# Patient Record
Sex: FEMALE | Race: WHITE
[De-identification: names, ages, dates, MRNs, and addresses within clinical notes are randomized per-mention and may not be internally consistent; named-entity substitution may affect disease eponyms.]

---

## 2022-07-03 ENCOUNTER — HOSPITAL ENCOUNTER (INPATIENT)
Dept: HOSPITAL 95 - ER | Age: 87
LOS: 4 days | Discharge: NURSING, REHAB OR LONG-TERM CARE | DRG: 193 | End: 2022-07-07
Attending: STUDENT IN AN ORGANIZED HEALTH CARE EDUCATION/TRAINING PROGRAM | Admitting: STUDENT IN AN ORGANIZED HEALTH CARE EDUCATION/TRAINING PROGRAM
Payer: COMMERCIAL

## 2022-07-03 VITALS — HEIGHT: 61 IN | WEIGHT: 139.99 LBS | BODY MASS INDEX: 26.43 KG/M2

## 2022-07-03 DIAGNOSIS — N39.0: ICD-10-CM

## 2022-07-03 DIAGNOSIS — E83.52: ICD-10-CM

## 2022-07-03 DIAGNOSIS — Z96.653: ICD-10-CM

## 2022-07-03 DIAGNOSIS — Z90.12: ICD-10-CM

## 2022-07-03 DIAGNOSIS — I48.91: ICD-10-CM

## 2022-07-03 DIAGNOSIS — Z85.3: ICD-10-CM

## 2022-07-03 DIAGNOSIS — Z98.890: ICD-10-CM

## 2022-07-03 DIAGNOSIS — Z90.710: ICD-10-CM

## 2022-07-03 DIAGNOSIS — Z79.811: ICD-10-CM

## 2022-07-03 DIAGNOSIS — Z79.82: ICD-10-CM

## 2022-07-03 DIAGNOSIS — E83.39: ICD-10-CM

## 2022-07-03 DIAGNOSIS — T43.225A: ICD-10-CM

## 2022-07-03 DIAGNOSIS — A04.72: ICD-10-CM

## 2022-07-03 DIAGNOSIS — Z99.2: ICD-10-CM

## 2022-07-03 DIAGNOSIS — Z79.02: ICD-10-CM

## 2022-07-03 DIAGNOSIS — E78.5: ICD-10-CM

## 2022-07-03 DIAGNOSIS — Z86.73: ICD-10-CM

## 2022-07-03 DIAGNOSIS — K80.20: ICD-10-CM

## 2022-07-03 DIAGNOSIS — F01.51: ICD-10-CM

## 2022-07-03 DIAGNOSIS — M54.9: ICD-10-CM

## 2022-07-03 DIAGNOSIS — G93.41: ICD-10-CM

## 2022-07-03 DIAGNOSIS — Z20.822: ICD-10-CM

## 2022-07-03 DIAGNOSIS — N18.6: ICD-10-CM

## 2022-07-03 DIAGNOSIS — Z79.899: ICD-10-CM

## 2022-07-03 DIAGNOSIS — J18.9: Primary | ICD-10-CM

## 2022-07-03 DIAGNOSIS — E87.1: ICD-10-CM

## 2022-07-03 DIAGNOSIS — T50.2X5A: ICD-10-CM

## 2022-07-03 DIAGNOSIS — E87.8: ICD-10-CM

## 2022-07-03 DIAGNOSIS — I12.0: ICD-10-CM

## 2022-07-03 DIAGNOSIS — F41.9: ICD-10-CM

## 2022-07-03 DIAGNOSIS — M51.9: ICD-10-CM

## 2022-07-03 DIAGNOSIS — F11.20: ICD-10-CM

## 2022-07-03 DIAGNOSIS — Z66: ICD-10-CM

## 2022-07-03 DIAGNOSIS — I70.0: ICD-10-CM

## 2022-07-03 DIAGNOSIS — G89.29: ICD-10-CM

## 2022-07-03 DIAGNOSIS — E87.6: ICD-10-CM

## 2022-07-03 LAB
ALBUMIN SERPL BCP-MCNC: 3 G/DL (ref 3.4–5)
ALBUMIN SERPL BCP-MCNC: 3.1 G/DL (ref 3.4–5)
ALBUMIN/GLOB SERPL: 0.8 {RATIO} (ref 0.8–1.8)
ALT SERPL W P-5'-P-CCNC: 27 U/L (ref 12–78)
ANION GAP SERPL CALCULATED.4IONS-SCNC: 10 MMOL/L (ref 6–16)
ANION GAP SERPL CALCULATED.4IONS-SCNC: 11 MMOL/L (ref 6–16)
AST SERPL W P-5'-P-CCNC: 29 U/L (ref 12–37)
BASOPHILS # BLD AUTO: 0.02 K/MM3 (ref 0–0.23)
BASOPHILS NFR BLD AUTO: 0 % (ref 0–2)
BILIRUB SERPL-MCNC: 1.6 MG/DL (ref 0.1–1)
BUN SERPL-MCNC: 15 MG/DL (ref 8–24)
BUN SERPL-MCNC: 16 MG/DL (ref 8–24)
CALCIUM SERPL-MCNC: 10 MG/DL (ref 8.5–10.1)
CALCIUM SERPL-MCNC: 10 MG/DL (ref 8.5–10.1)
CHLORIDE SERPL-SCNC: 92 MMOL/L (ref 98–108)
CHLORIDE SERPL-SCNC: 94 MMOL/L (ref 98–108)
CO2 SERPL-SCNC: 24 MMOL/L (ref 21–32)
CO2 SERPL-SCNC: 28 MMOL/L (ref 21–32)
CREAT SERPL-MCNC: 0.96 MG/DL (ref 0.4–1)
CREAT SERPL-MCNC: 0.99 MG/DL (ref 0.4–1)
DEPRECATED RDW RBC AUTO: 45.4 FL (ref 35.1–46.3)
EOSINOPHIL # BLD AUTO: 0 K/MM3 (ref 0–0.68)
EOSINOPHIL NFR BLD AUTO: 0 % (ref 0–6)
ERYTHROCYTE [DISTWIDTH] IN BLOOD BY AUTOMATED COUNT: 13.5 % (ref 11.7–14.2)
FLUAV RNA SPEC QL NAA+PROBE: NEGATIVE
FLUBV RNA SPEC QL NAA+PROBE: NEGATIVE
GLOBULIN SER CALC-MCNC: 3.8 G/DL (ref 2.2–4)
GLUCOSE SERPL-MCNC: 141 MG/DL (ref 70–99)
GLUCOSE SERPL-MCNC: 142 MG/DL (ref 70–99)
HCT VFR BLD AUTO: 38.7 % (ref 33–51)
HGB BLD-MCNC: 13.4 G/DL (ref 11.5–16)
IMM GRANULOCYTES # BLD AUTO: 0.06 K/MM3 (ref 0–0.1)
IMM GRANULOCYTES NFR BLD AUTO: 0 % (ref 0–1)
LEUKOCYTE ESTERASE UR QL STRIP: (no result)
LYMPHOCYTES # BLD AUTO: 0.57 K/MM3 (ref 0.84–5.2)
LYMPHOCYTES NFR BLD AUTO: 4 % (ref 21–46)
MAGNESIUM SERPL-MCNC: 1.7 MG/DL (ref 1.6–2.4)
MCHC RBC AUTO-ENTMCNC: 34.6 G/DL (ref 31.5–36.5)
MCV RBC AUTO: 92 FL (ref 80–100)
MONOCYTES # BLD AUTO: 0.75 K/MM3 (ref 0.16–1.47)
MONOCYTES NFR BLD AUTO: 6 % (ref 4–13)
NEUTROPHILS # BLD AUTO: 12.21 K/MM3 (ref 1.96–9.15)
NEUTROPHILS NFR BLD AUTO: 90 % (ref 41–73)
NRBC # BLD AUTO: 0 K/MM3 (ref 0–0.02)
NRBC BLD AUTO-RTO: 0 /100 WBC (ref 0–0.2)
PHOSPHATE SERPL-MCNC: 2 MG/DL (ref 2.5–4.9)
PLATELET # BLD AUTO: 258 K/MM3 (ref 150–400)
POTASSIUM SERPL-SCNC: 2.9 MMOL/L (ref 3.5–5.5)
POTASSIUM SERPL-SCNC: 3 MMOL/L (ref 3.5–5.5)
PROT SERPL-MCNC: 6.9 G/DL (ref 6.4–8.2)
PROT UR STRIP-MCNC: (no result) MG/DL
RSV RNA SPEC QL NAA+PROBE: NEGATIVE
SARS-COV-2 RNA RESP QL NAA+PROBE: NEGATIVE
SODIUM SERPL-SCNC: 129 MMOL/L (ref 136–145)
SODIUM SERPL-SCNC: 130 MMOL/L (ref 136–145)
SP GR SPEC: 1 (ref 1–1.02)
TSH SERPL DL<=0.005 MIU/L-ACNC: 0.57 UIU/ML (ref 0.36–4.8)
UROBILINOGEN UR STRIP-MCNC: (no result) MG/DL
WBC #/AREA URNS HPF: (no result) /HPF (ref 0–5)

## 2022-07-03 PROCEDURE — A9270 NON-COVERED ITEM OR SERVICE: HCPCS

## 2022-07-04 LAB
ALBUMIN SERPL BCP-MCNC: 2.6 G/DL (ref 3.4–5)
ANION GAP SERPL CALCULATED.4IONS-SCNC: 8 MMOL/L (ref 6–16)
BASOPHILS # BLD AUTO: 0.02 K/MM3 (ref 0–0.23)
BASOPHILS NFR BLD AUTO: 0 % (ref 0–2)
BUN SERPL-MCNC: 18 MG/DL (ref 8–24)
CALCIUM SERPL-MCNC: 9.2 MG/DL (ref 8.5–10.1)
CHLORIDE SERPL-SCNC: 94 MMOL/L (ref 98–108)
CO2 SERPL-SCNC: 26 MMOL/L (ref 21–32)
CREAT SERPL-MCNC: 0.61 MG/DL (ref 0.4–1)
DEPRECATED RDW RBC AUTO: 46.5 FL (ref 35.1–46.3)
EOSINOPHIL # BLD AUTO: 0.06 K/MM3 (ref 0–0.68)
EOSINOPHIL NFR BLD AUTO: 1 % (ref 0–6)
ERYTHROCYTE [DISTWIDTH] IN BLOOD BY AUTOMATED COUNT: 13.9 % (ref 11.7–14.2)
GLUCOSE SERPL-MCNC: 104 MG/DL (ref 70–99)
HCT VFR BLD AUTO: 32.3 % (ref 33–51)
HGB BLD-MCNC: 11.3 G/DL (ref 11.5–16)
IMM GRANULOCYTES # BLD AUTO: 0.05 K/MM3 (ref 0–0.1)
IMM GRANULOCYTES NFR BLD AUTO: 0 % (ref 0–1)
LYMPHOCYTES # BLD AUTO: 1.21 K/MM3 (ref 0.84–5.2)
LYMPHOCYTES NFR BLD AUTO: 10 % (ref 21–46)
MAGNESIUM SERPL-MCNC: 2.6 MG/DL (ref 1.6–2.4)
MCHC RBC AUTO-ENTMCNC: 35 G/DL (ref 31.5–36.5)
MCV RBC AUTO: 91 FL (ref 80–100)
MONOCYTES # BLD AUTO: 1.13 K/MM3 (ref 0.16–1.47)
MONOCYTES NFR BLD AUTO: 10 % (ref 4–13)
NEUTROPHILS # BLD AUTO: 9.16 K/MM3 (ref 1.96–9.15)
NEUTROPHILS NFR BLD AUTO: 79 % (ref 41–73)
NRBC # BLD AUTO: 0 K/MM3 (ref 0–0.02)
NRBC BLD AUTO-RTO: 0 /100 WBC (ref 0–0.2)
PHOSPHATE SERPL-MCNC: 1.9 MG/DL (ref 2.5–4.9)
PLATELET # BLD AUTO: 196 K/MM3 (ref 150–400)
POTASSIUM SERPL-SCNC: 3.3 MMOL/L (ref 3.5–5.5)
SODIUM SERPL-SCNC: 128 MMOL/L (ref 136–145)

## 2022-07-04 NOTE — NUR
NIGHT SHIFT SUMMARY
 
TOLERATED HS MEDS CRUSHED WITH APPLESAUCE AS THE PILLS WERE "TOO BIG" TO
SWALLOW OTHERWISE. HAS BEEN RESTING QUIETLY WITH FEW INTERRUPTIONS, OTHER
THAN
REQUESTS FOR "BEDPAN" AND ANALGESICS. IVF OF 0.5 NS INFUSING  ML/HR.
CALL LIGHT IN REACH. WILL CONTINUE TO MONITOR

## 2022-07-04 NOTE — NUR
SHIFT SUMMARY
PT AxOx3 WITH INTERM CONFUSION. PT COOPERATIVE WITH CARE. PT HAD ECHO TODAY.
PHYSICAL AND OCCUPATIONAL THERAPY IN FOR EVAL AND TX THIS SHIFT. PT REPORTS
CHRONIC PAIN TO R SIDE. MEDICATED PER EMAR WITH REPORTED RELIEF. PT REPORTS
GENERAL WEAKNESS. 2 ASSIST W/FWW AND GB FOR TRANSFERS. PT'S DAUGHTER IN FOR
VISIT THIS SHIFT, UPDATED ON PLAN OF CARE BY . PT IS CURRENTLY BEING
RECOMMENDED FOR SNF UPON DC. VITALS REVIEWED. PT IS RESTING IN BED WITH CALL
LIGHT IN REACH. DENIES ANY NEEDS AT THIS TIME.

## 2022-07-05 LAB
ALBUMIN SERPL BCP-MCNC: 2.9 G/DL (ref 3.4–5)
ANION GAP SERPL CALCULATED.4IONS-SCNC: 10 MMOL/L (ref 6–16)
BASOPHILS # BLD AUTO: 0.02 K/MM3 (ref 0–0.23)
BASOPHILS NFR BLD AUTO: 0 % (ref 0–2)
BUN SERPL-MCNC: 10 MG/DL (ref 8–24)
CALCIUM SERPL-MCNC: 9.1 MG/DL (ref 8.5–10.1)
CHLORIDE SERPL-SCNC: 91 MMOL/L (ref 98–108)
CO2 SERPL-SCNC: 24 MMOL/L (ref 21–32)
CREAT SERPL-MCNC: 0.44 MG/DL (ref 0.4–1)
DEPRECATED RDW RBC AUTO: 45.4 FL (ref 35.1–46.3)
EOSINOPHIL # BLD AUTO: 0.03 K/MM3 (ref 0–0.68)
EOSINOPHIL NFR BLD AUTO: 0 % (ref 0–6)
ERYTHROCYTE [DISTWIDTH] IN BLOOD BY AUTOMATED COUNT: 13.7 % (ref 11.7–14.2)
GLUCOSE SERPL-MCNC: 134 MG/DL (ref 70–99)
HCT VFR BLD AUTO: 36.7 % (ref 33–51)
HGB BLD-MCNC: 12.8 G/DL (ref 11.5–16)
IMM GRANULOCYTES # BLD AUTO: 0.05 K/MM3 (ref 0–0.1)
IMM GRANULOCYTES NFR BLD AUTO: 1 % (ref 0–1)
LYMPHOCYTES # BLD AUTO: 0.94 K/MM3 (ref 0.84–5.2)
LYMPHOCYTES NFR BLD AUTO: 10 % (ref 21–46)
MAGNESIUM SERPL-MCNC: 1.6 MG/DL (ref 1.6–2.4)
MCHC RBC AUTO-ENTMCNC: 34.9 G/DL (ref 31.5–36.5)
MCV RBC AUTO: 90 FL (ref 80–100)
MONOCYTES # BLD AUTO: 0.86 K/MM3 (ref 0.16–1.47)
MONOCYTES NFR BLD AUTO: 9 % (ref 4–13)
NEUTROPHILS # BLD AUTO: 7.98 K/MM3 (ref 1.96–9.15)
NEUTROPHILS NFR BLD AUTO: 81 % (ref 41–73)
NRBC # BLD AUTO: 0 K/MM3 (ref 0–0.02)
NRBC BLD AUTO-RTO: 0 /100 WBC (ref 0–0.2)
PHOSPHATE SERPL-MCNC: 1.7 MG/DL (ref 2.5–4.9)
PLATELET # BLD AUTO: 227 K/MM3 (ref 150–400)
POTASSIUM SERPL-SCNC: 2.9 MMOL/L (ref 3.5–5.5)
SODIUM SERPL-SCNC: 125 MMOL/L (ref 136–145)

## 2022-07-05 NOTE — NUR
SPOKE TO DAUGHTER LANEY SINAN DAVISON SPOKE TO THIS RN AND STATED THAT THE PT LIVES ALONE IN HER HOUSE. HER
DAUGHTER JAIME LIVES ON THE PROPERTY APART FROM THE HOUSE. DR. COATES GIVEN
HUGH NUMBER.
 
LANEY DESOUZA POA: [phone]

## 2022-07-05 NOTE — NUR
SHIFT SUMMARY
PT FELL THIS AM IN THE HALLWAY. SEE POST FALL NOT. PT MORE ORIENTED THIS
AFTERNOON AND AWARE OF WHERE SHE IS AND WHY SHE IS HERE. PT DOES NOT REMEMBER
THE MORNING OR FALLING. PT TRANSFERING EASIER WITH ONLY 1 ASSIST THIS
AFTERNOON. MEDICATED FOR PAIN TWICE THIS SHIFT SO FAR. DR. COATES NOTIFIED
THAT PTS DAUGHTER, LANEY WANTED TO BE UPDATED ON PT CONDITION. NO OTHER ACUTE
CHANGES IN ASSESSMENT AT THIS TIME. VS REVIEWED. CALL LIGHT IN REACH. DENIES
OTHER NEEDS A THIS TIME.

## 2022-07-05 NOTE — NUR
SHIFT SUMMARY
85 YR F ADMITTED ON 7/3/22 FOR UTI/PNEUMONIA. DNR. PT IS VERY CONFUSED AND
DOES NOT SEEM TO UNDERSTAND WHERE SHE IS OR WHY. SHE FIDGETS WITH HER HANDS
ALOT AS IF SHE IS ANXIOUS AND DOESN'T KNOW WHAT TO DO. SHE STATED MULTIPLE
TIMES THAT SHE NEEDED TO "GET THINGS IN ORDER". PER TELE TECH PT HAD SEVERAL
ST SPIKES IT WAS DISCUSSED W/ CHARGE NURSE WHO DECIDED THAT AN EKG WAS NOT
WARRANTED AT THIS TIME.

## 2022-07-06 LAB
ALBUMIN SERPL BCP-MCNC: 2.7 G/DL (ref 3.4–5)
ALBUMIN/GLOB SERPL: 0.8 {RATIO} (ref 0.8–1.8)
ALT SERPL W P-5'-P-CCNC: 30 U/L (ref 12–78)
ANION GAP SERPL CALCULATED.4IONS-SCNC: 7 MMOL/L (ref 6–16)
AST SERPL W P-5'-P-CCNC: 30 U/L (ref 12–37)
BASOPHILS # BLD AUTO: 0.02 K/MM3 (ref 0–0.23)
BASOPHILS NFR BLD AUTO: 0 % (ref 0–2)
BILIRUB SERPL-MCNC: 0.9 MG/DL (ref 0.1–1)
BUN SERPL-MCNC: 15 MG/DL (ref 8–24)
C DIFF TOX GENS STL QL NAA+PROBE: POSITIVE
CALCIUM SERPL-MCNC: 9.3 MG/DL (ref 8.5–10.1)
CHLORIDE SERPL-SCNC: 92 MMOL/L (ref 98–108)
CO2 SERPL-SCNC: 28 MMOL/L (ref 21–32)
CREAT SERPL-MCNC: 0.5 MG/DL (ref 0.4–1)
DEPRECATED RDW RBC AUTO: 46.7 FL (ref 35.1–46.3)
EOSINOPHIL # BLD AUTO: 0.11 K/MM3 (ref 0–0.68)
EOSINOPHIL NFR BLD AUTO: 1 % (ref 0–6)
ERYTHROCYTE [DISTWIDTH] IN BLOOD BY AUTOMATED COUNT: 13.9 % (ref 11.7–14.2)
GLOBULIN SER CALC-MCNC: 3.6 G/DL (ref 2.2–4)
GLUCOSE SERPL-MCNC: 100 MG/DL (ref 70–99)
HCT VFR BLD AUTO: 32 % (ref 33–51)
HGB BLD-MCNC: 11.2 G/DL (ref 11.5–16)
IMM GRANULOCYTES # BLD AUTO: 0.04 K/MM3 (ref 0–0.1)
IMM GRANULOCYTES NFR BLD AUTO: 0 % (ref 0–1)
LYMPHOCYTES # BLD AUTO: 1.3 K/MM3 (ref 0.84–5.2)
LYMPHOCYTES NFR BLD AUTO: 14 % (ref 21–46)
MCHC RBC AUTO-ENTMCNC: 35 G/DL (ref 31.5–36.5)
MCV RBC AUTO: 91 FL (ref 80–100)
MONOCYTES # BLD AUTO: 0.91 K/MM3 (ref 0.16–1.47)
MONOCYTES NFR BLD AUTO: 10 % (ref 4–13)
NEUTROPHILS # BLD AUTO: 6.8 K/MM3 (ref 1.96–9.15)
NEUTROPHILS NFR BLD AUTO: 74 % (ref 41–73)
NRBC # BLD AUTO: 0 K/MM3 (ref 0–0.02)
NRBC BLD AUTO-RTO: 0 /100 WBC (ref 0–0.2)
PLATELET # BLD AUTO: 215 K/MM3 (ref 150–400)
POTASSIUM SERPL-SCNC: 4.1 MMOL/L (ref 3.5–5.5)
PROT SERPL-MCNC: 6.3 G/DL (ref 6.4–8.2)
SODIUM SERPL-SCNC: 127 MMOL/L (ref 136–145)

## 2022-07-06 NOTE — NUR
pt is somewhat better and more cooperative with care, she took her po meds one
at a time with water, then had me crush the bigger ones, started on vanco for
c-diff, allowed me to place a lidocane patch, but states it wont help. is
going to rest now, call light in reach.

## 2022-07-06 NOTE — NUR
a bit more cooperative this evening, asking repetative questions, gave her a
pain pill although I couldn't get a straight answer if in pain, but when asked
how much pain she said 9. no acute changes this shift. call light in reach.

## 2022-07-06 NOTE — NUR
PT in room to work with pt, she needed to go to the [facility], and wanted a
female in the room, the nurse assisted, she wasn't doing as much for herself
this time as she did this am, again everything was wrong, did have a small
soft brown bm, got her back to bed and offered her a pain pill, she wont give
a straight answer, encouraged her to call if she decides she wants one. call
light in reach.

## 2022-07-06 NOTE — NUR
Assited with pt care. Will review pt with OT\PT pt aggitated has some
difficulty tracking conversation gets anxious.

## 2022-07-06 NOTE — NUR
SHIFT SUMMARY
NO ACUTE CHANGES TO PT CONDITION. PT RECENTLY MEDICATED PER EMAR FOR RECURRING
BACK AND SIDE PAIN. PT HAS NO OTHER COMPLAINTS AT THIS TIME. CALL LIGHT IS
WITHIN HER REACH AND SHE HAS BEEN INSTRUCTED ON HOW TO USE IT.

## 2022-07-07 LAB
ALBUMIN SERPL BCP-MCNC: 2.8 G/DL (ref 3.4–5)
ANION GAP SERPL CALCULATED.4IONS-SCNC: 6 MMOL/L (ref 6–16)
BASOPHILS # BLD AUTO: 0.03 K/MM3 (ref 0–0.23)
BASOPHILS NFR BLD AUTO: 0 % (ref 0–2)
BUN SERPL-MCNC: 10 MG/DL (ref 8–24)
CALCIUM SERPL-MCNC: 9.5 MG/DL (ref 8.5–10.1)
CHLORIDE SERPL-SCNC: 95 MMOL/L (ref 98–108)
CO2 SERPL-SCNC: 27 MMOL/L (ref 21–32)
CREAT SERPL-MCNC: 0.55 MG/DL (ref 0.4–1)
DEPRECATED RDW RBC AUTO: 48.3 FL (ref 35.1–46.3)
EOSINOPHIL # BLD AUTO: 0.11 K/MM3 (ref 0–0.68)
EOSINOPHIL NFR BLD AUTO: 1 % (ref 0–6)
ERYTHROCYTE [DISTWIDTH] IN BLOOD BY AUTOMATED COUNT: 14 % (ref 11.7–14.2)
FLUAV RNA SPEC QL NAA+PROBE: NEGATIVE
FLUBV RNA SPEC QL NAA+PROBE: NEGATIVE
GLUCOSE SERPL-MCNC: 111 MG/DL (ref 70–99)
HCT VFR BLD AUTO: 34.4 % (ref 33–51)
HGB BLD-MCNC: 11.7 G/DL (ref 11.5–16)
IMM GRANULOCYTES # BLD AUTO: 0.04 K/MM3 (ref 0–0.1)
IMM GRANULOCYTES NFR BLD AUTO: 0 % (ref 0–1)
LYMPHOCYTES # BLD AUTO: 1.23 K/MM3 (ref 0.84–5.2)
LYMPHOCYTES NFR BLD AUTO: 12 % (ref 21–46)
MCHC RBC AUTO-ENTMCNC: 34 G/DL (ref 31.5–36.5)
MCV RBC AUTO: 93 FL (ref 80–100)
MONOCYTES # BLD AUTO: 1.07 K/MM3 (ref 0.16–1.47)
MONOCYTES NFR BLD AUTO: 10 % (ref 4–13)
NEUTROPHILS # BLD AUTO: 7.86 K/MM3 (ref 1.96–9.15)
NEUTROPHILS NFR BLD AUTO: 76 % (ref 41–73)
NRBC # BLD AUTO: 0 K/MM3 (ref 0–0.02)
NRBC BLD AUTO-RTO: 0 /100 WBC (ref 0–0.2)
PHOSPHATE SERPL-MCNC: 2.6 MG/DL (ref 2.5–4.9)
PLATELET # BLD AUTO: 216 K/MM3 (ref 150–400)
POTASSIUM SERPL-SCNC: 4.3 MMOL/L (ref 3.5–5.5)
RSV RNA SPEC QL NAA+PROBE: NEGATIVE
SARS-COV-2 RNA RESP QL NAA+PROBE: NEGATIVE
SODIUM SERPL-SCNC: 128 MMOL/L (ref 136–145)

## 2022-07-07 NOTE — NUR
DISCHARGE SUMMARY
PT DISCHARGED TO [facility]. TOOK MEDICATIONS THIS MORNING CRUSHED IN
APPLESAUCE BUT REFUSED AFTERNOON MEDICATIONS. IS CONFUSED AND CAN BE LABILE.
WORKED WELL WITH OCCUPATIONAL THERAPY AND UP TO THE CHAIR FOR MEALS. DC'D TO
SNF BY AMBULANCE. REPORT CALLED TO [facility] RN.

## 2022-07-07 NOTE — NUR
SHIFT SUMMARY
PT CONFUSED AND AGITATED. PT REFUSING TO TAKE MOST OF HER MEDICATIONS THIS
SHIFT. PT TRIED TO GET OUT OF BED X 1 DURING THIS EVENING, SETTING OFF BED
ALARM. ALARM WAS RESET AND PT HELPED BACK TO BED. PT HAS CALL LIGHT WITHIN
REACH. PT DID HAVE HR IN 'S FOR AROUND TWO HOURS.  CALLED AND WAS
GIVEN AN ORDER FOR 25 MG SEROQUEL. CHARGE RN WAS ABLE TO GET THE PT TO TAKE
THE MEDICATION. HER HR IS NOW BACK IN THE 70'S.

## 2022-07-07 NOTE — NUR
PT REFUSING TO TAKE MOST OF HER MEDICATIONS TONIGHT. MOST RECENTLY PT REFUSED
TO TAKE HER VANCO AFTER SHE STATED SHE WOULD TAKE IT.